# Patient Record
(demographics unavailable — no encounter records)

---

## 2018-11-01 NOTE — POSTOPPROG
Post Op Note


Date of Operation: 11/01/18


Surgeon: Finesse Alcaraz


Assistant: ARACELIS Deng PAC


Anesthesia: GET(General Endotracheal)


Pre-op Diagnosis: Sacroilitis


Post-op Diagnosis: Sacroilitis


Indication: Sacroilitis


Procedure: bilateral SI join fusion


Inf/Abcess present in the surg proc area at time of surgery?: No


EBL: Minimal





PA Addendum





- Addendum


.: 





S: bilateral gluteal pain, denies any new numbness, tingling or weakness





O: NAD A&Ox3 MAEx4 5/5 and equal in BUE and BLE





A/P: 75y/o male s/p bilateral SI joint fusion


Advance diet as tolerated


Post op xrays pending


Optimize pain management


Patient may d/c home when meets PACU criteria

## 2018-11-01 NOTE — PDHPUP
History & Physical Update


H&P update statement: 


This history and physical update is based on an assessment of the patient which 

was completed after admission or registration (within 24 hours), but prior to 

the surgery/procedure.





H&P update: H&P reviewed & patient examined, no change in patient's condition 

since H&P completed (Consents signed and sited marked.  All questions answered.)

## 2018-11-01 NOTE — POSTANESTH
Post Anesthetic Evaluation


Cardiovascular Status: Normal, Stable, Similar to Pre-Op Cond


Respiratory Status: Normal, Stable, Similar to Pre-op Cond.


Level of Consciousness/Mental Status: Mildly Sleepy, Arousable


Pain Control: Adequate, Prn Tx Ordered


Nausea/Vomiting Control: Adequate, Prn Tx Ordered


Complications Possibly Related to Anesthesia: None Noted

## 2018-11-01 NOTE — PDANEPAE
ANE Past Medical History





- Cardiovascular History


Hx Hypertension: No


Hx Arrhythmias: No


Hx Chest Pain: No


Hx Coronary Artery / Peripheral Vascular Disease: No


Hx CHF / Valvular Disease: No


Hx Palpitations: No





- Pulmonary History


Hx COPD: No


Hx Asthma/Reactive Airway Disease: No


Hx Recent Upper Respiratory Infection: No


Hx Oxygen in Use at Home: No


Hx Sleep Apnea: No


Sleep Apnea Screening Result - Last Documented: Negative





- Neurologic History


Hx Cerebrovascular Accident: No


Hx Seizures: No


Hx Dementia: No





- Endocrine History


Hx Diabetes: No





- Renal History


Hx Renal Disorders: Yes


Renal History Comment: HX OF PREV STONE





- Liver History


Hx Hepatic Disorders: No





- Neurological & Psychiatric Hx


Hx Neurological and Psychiatric Disorders: No





- Cancer History


Hx Cancer: Yes


Cancer History Comment: SKIN RT EAR AND BRIDGE OF NOSE





- Congenital Disorder History


Hx Congenital Disorders: No





- GI History


Hx Gastrointestinal Disorders: Yes


Gastrointestinal History Comment: CONSTIPATION





- Other Health History


Other Health History: SACROLITIS





- Chronic Pain History


Chronic Pain: Yes (LOWER BACK WITH HIPS)





- Surgical History


Prior Surgeries: LUMBAR FUSION 11/2017 AT [facility].  LUMBAR FUSION 10/2017 WITH POST 

HARDWARE REMVL.  LITHOTRIPSY.  NERVE ABLATION SI 1-3 BILATERAL





ANE Review of Systems


Review of Systems: 








- Exercise capacity


METS (RN): 2 METS





ANE Patient History





- Allergies


Allergies/Adverse Reactions: 








gabapentin Allergy (Verified 10/09/18 11:09)


 MADE HIM FEEL LIKE HE WAS DYING








- Home Medications


Home Medications: 








Herbals/Supplements -Info Only DAILY 10/09/18 [Last Taken Unknown]


Meloxicam DAILY 10/09/18 [Last Taken Unknown]


Oxycodone HCl QID 10/09/18 [Last Taken Unknown]








- NPO status


NPO Since - Liquids (Date): 11/01/18


NPO Since - Liquids (Time): 05:00


NPO Since - Solids (Date): 10/31/18


NPO Since - Solids (Time): 09:00





- Smoking Hx


Smoking Status: Never smoked





ANE Labs/Vital Signs





- Vital Signs


Blood Pressure: 135/79


Heart Rate: 58


Respiratory Rate: 16


O2 Sat (%): 95


Height: 167.64 cm


Weight: 88.451 kg





ANE Physical Exam





- Airway


Neck exam: FROM


Mallampati Score: Class 1


Mouth exam: normal dental/mouth exam





- Pulmonary


Pulmonary: no respiratory distress, no rales or rhonchi, clear to auscultation





- Cardiovascular


Cardiovascular: regular rate and rhythym, no murmur, rub, or gallop





- ASA Status


ASA Status: II





ANE Anesthesia Plan


Anesthesia Plan: general endotracheal anesthesia


Total IV Anesthesia: Yes

## 2018-11-01 NOTE — GOP
DATE OF OPERATION:  11/01/2018



SURGEON:  Finesse Alcaraz MD



ASSISTANT:  LUIS De La Rosa.



ANESTHESIA:  General.



PREOPERATIVE DIAGNOSIS:  

1.  Bilateral sacroiliitis.

2.  Treatment refractory to nonoperative intervention.



POSTOPERATIVE DIAGNOSIS:  

1.  Bilateral sacroiliitis.

2.  Treatment refractory to nonoperative intervention.



PROCEDURE PERFORMED:  

1.  Right-sided minimally invasive sacroiliac joint fusion with two 50 mm 
Medtronic [place] cages filled with morselized autograft and allograft.  

2.  Left-sided minimally invasive sacroiliac joint fusion with two 50 mm 
Medtronic [place] cages filled with morselized autograft and allograft. 

3.  Use of intraoperative 3D Stealth navigation. 

4.  Use of intraoperative fluoroscopy, less than 1 hour physician time.

5.  Use of neuromonitoring.



FINDINGS:  per imaging



SPECIMENS:  None.



ESTIMATED BLOOD LOSS:  10 mL.



INDICATIONS:  The patient is a 74-year-old gentleman who has undergone multiple 
prior lumbar spine surgeries.  He presented with worsening bilateral sacroiliac 
joint dysfunction which has been treated with conservative management for 
years.  The patient continued to have symptoms.  After discussion of the risks, 
benefits, and treatment alternatives and after failing nonoperative intervention
, we decided to proceed forth with surgery as described above.



DESCRIPTION OF PROCEDURE:  The patient was brought to the operating theater and 
underwent general endotracheal anesthesia without complications.  He had 
Venodynes, NADEEN hose, and appropriate lines placed by Anesthesia.  He was 
flipped prone on the Diego table and all bony prominences inspected and 
padded.  The lower lumbar region was prepped and draped in the usual sterile 
surgical fashion.  A time-out was completed per protocol.  The patient received 
antibiotics within 1 hour of incision. 



The posterior superior iliac spine on the left side was palpated and an 
incision infiltrated with Marcaine with epinephrine.  The incision was opened 
with a scalpel blade.  The percutaneous pin for the 3D Stealth navigation clamp 
was then placed into the left posterior superior iliac spine.  The 3D Stealth 
navigation system was then brought in the field.  We completed a 3D Stealth 
navigation spin.  



Using 3D Stealth navigation, we picked our entry point to the left side and 
then right side of the sacroiliac joint.  These were marked as two vertical 
incisions on the buttocks.  We then first started on the right side, where we 
drilled and tapped and then measured two holes across the right side of the 
sacroiliac joint.  These were measured as 50 mm screws.  We then placed 
morselized autograft and allograft into two Medtronic [place] 50 mm screws which 
were placed across the right side of the sacroiliac joint.  We then proceeded 
in the exact same fashion on the left side, where we then placed again two 50 
mm Rialto Medtronic screws filled with morselized autograft and allograft 
across the left side of the sacroiliac joint using the 3D Stealth navigation 
system.  Another 3D Stealth navigation spin demonstrated excellent placement of 
the hardware.  



The navigation system was removed from the field and the wounds irrigated 
copiously with bacitracin irrigation.  We then closed the wounds in multiple 
layers, including Vicryl sutures for the deep layers and Dermabond for the 
skin.  The patient's wounds were dressed sterilely.  He was then awakened, 
extubated, and taken to the recovery room in stable condition. 



There were no complications and no noted changes on neuromonitoring throughout 
the procedure.



The use of the PA was crucial to retracting and protecting the soft tissues 
intraoperatively.



COMPLICATIONS:  None.







Job #:  [id number]

MTDD